# Patient Record
Sex: FEMALE | Race: WHITE | Employment: UNEMPLOYED | URBAN - METROPOLITAN AREA
[De-identification: names, ages, dates, MRNs, and addresses within clinical notes are randomized per-mention and may not be internally consistent; named-entity substitution may affect disease eponyms.]

---

## 2021-02-21 ENCOUNTER — HOSPITAL ENCOUNTER (EMERGENCY)
Age: 26
Discharge: HOME | End: 2021-02-21
Attending: EMERGENCY MEDICINE
Payer: COMMERCIAL

## 2021-02-21 ENCOUNTER — APPOINTMENT (OUTPATIENT)
Dept: GENERAL RADIOLOGY | Age: 26
End: 2021-02-21
Payer: COMMERCIAL

## 2021-02-21 VITALS
HEART RATE: 106 BPM | BODY MASS INDEX: 34.53 KG/M2 | SYSTOLIC BLOOD PRESSURE: 121 MMHG | TEMPERATURE: 98.1 F | OXYGEN SATURATION: 99 % | RESPIRATION RATE: 18 BRPM | WEIGHT: 220 LBS | DIASTOLIC BLOOD PRESSURE: 75 MMHG | HEIGHT: 67 IN

## 2021-02-21 DIAGNOSIS — F12.90 MARIJUANA USE: Primary | ICD-10-CM

## 2021-02-21 DIAGNOSIS — R00.2 PALPITATIONS: ICD-10-CM

## 2021-02-21 LAB
ABSOLUTE EOS #: 0.1 K/UL (ref 0–0.4)
ABSOLUTE IMMATURE GRANULOCYTE: ABNORMAL K/UL (ref 0–0.3)
ABSOLUTE LYMPH #: 3.6 K/UL (ref 1–4.8)
ABSOLUTE MONO #: 0.9 K/UL (ref 0.1–1.3)
ANION GAP SERPL CALCULATED.3IONS-SCNC: 13 MMOL/L (ref 9–17)
BASOPHILS # BLD: 1 % (ref 0–2)
BASOPHILS ABSOLUTE: 0.1 K/UL (ref 0–0.2)
BUN BLDV-MCNC: 17 MG/DL (ref 6–20)
BUN/CREAT BLD: ABNORMAL (ref 9–20)
CALCIUM SERPL-MCNC: 9.4 MG/DL (ref 8.6–10.4)
CHLORIDE BLD-SCNC: 102 MMOL/L (ref 98–107)
CO2: 23 MMOL/L (ref 20–31)
CREAT SERPL-MCNC: 0.96 MG/DL (ref 0.5–0.9)
D-DIMER QUANTITATIVE: 0.31 MG/L FEU (ref 0–0.59)
DIFFERENTIAL TYPE: ABNORMAL
EOSINOPHILS RELATIVE PERCENT: 1 % (ref 0–4)
GFR AFRICAN AMERICAN: >60 ML/MIN
GFR NON-AFRICAN AMERICAN: >60 ML/MIN
GFR SERPL CREATININE-BSD FRML MDRD: ABNORMAL ML/MIN/{1.73_M2}
GFR SERPL CREATININE-BSD FRML MDRD: ABNORMAL ML/MIN/{1.73_M2}
GLUCOSE BLD-MCNC: 179 MG/DL (ref 70–99)
HCG QUALITATIVE: NEGATIVE
HCT VFR BLD CALC: 33 % (ref 36–46)
HEMOGLOBIN: 11.4 G/DL (ref 12–16)
IMMATURE GRANULOCYTES: ABNORMAL %
LYMPHOCYTES # BLD: 33 % (ref 24–44)
MAGNESIUM: 1.8 MG/DL (ref 1.6–2.6)
MCH RBC QN AUTO: 30 PG (ref 26–34)
MCHC RBC AUTO-ENTMCNC: 34.4 G/DL (ref 31–37)
MCV RBC AUTO: 87.3 FL (ref 80–100)
MONOCYTES # BLD: 8 % (ref 1–7)
NRBC AUTOMATED: ABNORMAL PER 100 WBC
PDW BLD-RTO: 12.9 % (ref 11.5–14.9)
PLATELET # BLD: 264 K/UL (ref 150–450)
PLATELET ESTIMATE: ABNORMAL
PMV BLD AUTO: 9.6 FL (ref 6–12)
POTASSIUM SERPL-SCNC: 3.1 MMOL/L (ref 3.7–5.3)
RBC # BLD: 3.79 M/UL (ref 4–5.2)
RBC # BLD: ABNORMAL 10*6/UL
SEG NEUTROPHILS: 57 % (ref 36–66)
SEGMENTED NEUTROPHILS ABSOLUTE COUNT: 6.1 K/UL (ref 1.3–9.1)
SODIUM BLD-SCNC: 138 MMOL/L (ref 135–144)
TROPONIN INTERP: NORMAL
TROPONIN T: NORMAL NG/ML
TROPONIN, HIGH SENSITIVITY: <6 NG/L (ref 0–14)
TSH SERPL DL<=0.05 MIU/L-ACNC: 2.02 MIU/L (ref 0.3–5)
WBC # BLD: 10.7 K/UL (ref 3.5–11)
WBC # BLD: ABNORMAL 10*3/UL

## 2021-02-21 PROCEDURE — 2580000003 HC RX 258: Performed by: EMERGENCY MEDICINE

## 2021-02-21 PROCEDURE — 6360000002 HC RX W HCPCS: Performed by: EMERGENCY MEDICINE

## 2021-02-21 PROCEDURE — 83735 ASSAY OF MAGNESIUM: CPT

## 2021-02-21 PROCEDURE — 84484 ASSAY OF TROPONIN QUANT: CPT

## 2021-02-21 PROCEDURE — 85379 FIBRIN DEGRADATION QUANT: CPT

## 2021-02-21 PROCEDURE — 99283 EMERGENCY DEPT VISIT LOW MDM: CPT

## 2021-02-21 PROCEDURE — 80048 BASIC METABOLIC PNL TOTAL CA: CPT

## 2021-02-21 PROCEDURE — 96375 TX/PRO/DX INJ NEW DRUG ADDON: CPT

## 2021-02-21 PROCEDURE — 85025 COMPLETE CBC W/AUTO DIFF WBC: CPT

## 2021-02-21 PROCEDURE — 96374 THER/PROPH/DIAG INJ IV PUSH: CPT

## 2021-02-21 PROCEDURE — 36415 COLL VENOUS BLD VENIPUNCTURE: CPT

## 2021-02-21 PROCEDURE — 84703 CHORIONIC GONADOTROPIN ASSAY: CPT

## 2021-02-21 PROCEDURE — 71045 X-RAY EXAM CHEST 1 VIEW: CPT

## 2021-02-21 PROCEDURE — 93005 ELECTROCARDIOGRAM TRACING: CPT | Performed by: EMERGENCY MEDICINE

## 2021-02-21 PROCEDURE — 84443 ASSAY THYROID STIM HORMONE: CPT

## 2021-02-21 RX ORDER — DEXTROAMPHETAMINE SACCHARATE, AMPHETAMINE ASPARTATE, DEXTROAMPHETAMINE SULFATE AND AMPHETAMINE SULFATE 5; 5; 5; 5 MG/1; MG/1; MG/1; MG/1
20 TABLET ORAL DAILY
COMMUNITY

## 2021-02-21 RX ORDER — LORAZEPAM 2 MG/ML
1 INJECTION INTRAMUSCULAR ONCE
Status: COMPLETED | OUTPATIENT
Start: 2021-02-21 | End: 2021-02-21

## 2021-02-21 RX ORDER — ONDANSETRON 2 MG/ML
4 INJECTION INTRAMUSCULAR; INTRAVENOUS ONCE
Status: COMPLETED | OUTPATIENT
Start: 2021-02-21 | End: 2021-02-21

## 2021-02-21 RX ORDER — POTASSIUM CHLORIDE 20 MEQ/1
40 TABLET, EXTENDED RELEASE ORAL ONCE
Status: DISCONTINUED | OUTPATIENT
Start: 2021-02-21 | End: 2021-02-21 | Stop reason: HOSPADM

## 2021-02-21 RX ORDER — 0.9 % SODIUM CHLORIDE 0.9 %
2000 INTRAVENOUS SOLUTION INTRAVENOUS ONCE
Status: COMPLETED | OUTPATIENT
Start: 2021-02-21 | End: 2021-02-21

## 2021-02-21 RX ADMIN — SODIUM CHLORIDE 2000 ML: 9 INJECTION, SOLUTION INTRAVENOUS at 03:12

## 2021-02-21 RX ADMIN — LORAZEPAM 1 MG: 2 INJECTION INTRAMUSCULAR; INTRAVENOUS at 03:12

## 2021-02-21 RX ADMIN — ONDANSETRON 4 MG: 2 INJECTION INTRAMUSCULAR; INTRAVENOUS at 03:43

## 2021-02-21 ASSESSMENT — ENCOUNTER SYMPTOMS
BACK PAIN: 0
ABDOMINAL PAIN: 0
VOMITING: 0
COUGH: 0
SHORTNESS OF BREATH: 1
DIARRHEA: 0
NAUSEA: 1

## 2021-02-21 ASSESSMENT — PAIN DESCRIPTION - LOCATION: LOCATION: CHEST

## 2021-02-21 ASSESSMENT — PAIN SCALES - GENERAL: PAINLEVEL_OUTOF10: 7

## 2021-02-21 ASSESSMENT — PAIN DESCRIPTION - DESCRIPTORS: DESCRIPTORS: TIGHTNESS

## 2021-02-21 ASSESSMENT — PAIN DESCRIPTION - FREQUENCY: FREQUENCY: CONTINUOUS

## 2021-02-21 ASSESSMENT — PAIN DESCRIPTION - PAIN TYPE: TYPE: ACUTE PAIN

## 2021-02-21 NOTE — ED PROVIDER NOTES
EMERGENCY DEPARTMENT ENCOUNTER    Pt Name: Papito Zaidi  MRN: [id number]  [place] 1995  Date of evaluation: 2/21/21  CHIEF COMPLAINT       Chief Complaint   Patient presents with    Dizziness    Palpitations     HISTORY OF PRESENT ILLNESS   HPI     This is a 81-year-old female who comes in today. The patient states that she was celebrating her birthday she had a beer and a half and a 2 edibles and around midnight she started feeling very anxious palpitations chest tightness and near syncope denies history of similar symptoms patient states that she normally does not use marijuana. She denies history of similar symptoms denies history of anxiety. She denies history of thyroid disease she denies family history of early heart attacks or early death. She denies any recent illness fevers chills cough no abdominal pain or vomiting but does feel mildly nauseous    REVIEW OF SYSTEMS     Review of Systems   Constitutional: Negative for fever. HENT: Negative for congestion. Respiratory: Positive for shortness of breath. Negative for cough. Cardiovascular: Positive for chest pain and palpitations. Gastrointestinal: Positive for nausea. Negative for abdominal pain, diarrhea and vomiting. Genitourinary: Negative for dysuria. Musculoskeletal: Negative for back pain. Skin: Negative for rash. Neurological: Negative for headaches. Psychiatric/Behavioral: The patient is nervous/anxious. All other systems reviewed and are negative. PASTMEDICAL HISTORY   History reviewed. No pertinent past medical history. SURGICAL HISTORY     History reviewed. No pertinent surgical history. CURRENT MEDICATIONS       Previous Medications    AMPHETAMINE-DEXTROAMPHETAMINE (ADDERALL) 20 MG TABLET    Take 20 mg by mouth daily. METHYLPHENIDATE (CONCERTA) 27 MG CR TABLET         ALLERGIES     has No Known Allergies. FAMILY HISTORY     has no family status information on file.       SOCIAL HISTORY Social History     Tobacco Use    Smoking status: Never Smoker    Smokeless tobacco: Never Used   Substance Use Topics    Alcohol use: No    Drug use: No     PHYSICAL EXAM     INITIAL VITALS: /75   Pulse 106   Temp 98.1 °F (36.7 °C) (Oral)   Resp 18   Ht 5' 7\" (1.702 m)   Wt 220 lb (99.8 kg)   SpO2 99%   BMI 34.46 kg/m²    Physical Exam  Vitals signs and nursing note reviewed. Constitutional:       Appearance: She is well-developed. HENT:      Head: Normocephalic and atraumatic. Eyes:      Conjunctiva/sclera: Conjunctivae normal.   Neck:      Musculoskeletal: Neck supple. Cardiovascular:      Rate and Rhythm: Regular rhythm. Tachycardia present. Heart sounds: No murmur. No friction rub. Pulmonary:      Effort: Pulmonary effort is normal. No respiratory distress. Breath sounds: Normal breath sounds. No wheezing or rhonchi. Abdominal:      General: There is no distension. Palpations: Abdomen is soft. Tenderness: There is no abdominal tenderness. There is no guarding or rebound. Musculoskeletal:      Comments: No lower extremity edema   Skin:     General: Skin is warm and dry. Capillary Refill: Capillary refill takes less than 2 seconds. Neurological:      Mental Status: She is alert. Psychiatric:      Comments: Appears anxious       DIAGNOSTIC RESULTS   RADIOLOGY:All plain film, CT, MRI, and formal ultrasound images (except ED bedside ultrasound) are read by the radiologist, see reports below, unless otherwisenoted in MDM or here. XR CHEST PORTABLE   Final Result   1. Low lung volumes. LABS: All lab results were reviewed by myself, and all abnormals are listed below.   Labs Reviewed   CBC WITH AUTO DIFFERENTIAL - Abnormal; Notable for the following components:       Result Value    RBC 3.79 (*)     Hemoglobin 11.4 (*)     Hematocrit 33.0 (*)     Monocytes 8 (*)     All other components within normal limits   BASIC METABOLIC PANEL - Abnormal; Notable for the following components:    Glucose 179 (*)     CREATININE 0.96 (*)     Potassium 3.1 (*)     All other components within normal limits   MAGNESIUM   TROPONIN   HCG, SERUM, QUALITATIVE   TSH WITH REFLEX   D-DIMER, QUANTITATIVE   TROPONIN       EMERGENCY DEPARTMENTCOURSE:   Differential diagnosis includes drug reaction ACS thyroid dysfunction electrolyte abnormality. Patient symptoms started after using 2 edibles and she normally does not do drugs most likely the etiology for the patient's symptoms. We will give her dose of Ativan    4:13 AM EST  Patient has mildly low potassium at 3.1 no other electrolyte abnormalities no anion gap elevation no elevation in creatinine no leukocytosis mild anemia with a hemoglobin of 11.4 TSH is normal pregnancy is negative troponin is negative D-dimer is not elevated do not believe that she has a pulmonary embolism at this time.  Patient feels better after dose of Ativan she is resting comfortably she feels much better she was given IV fluids do not believe that she requires further work-up in the emergency department the patient will be discharged she was biased against no longer using marijuana       EKG:All EKG's are interpreted by the Emergency Department Physician who either signs or Co-signs this chart in the absence of a cardiologist.  Sinus tachycardia with a heart rate of 139 bpm normal axis no acute ST or T wave changes      Vitals:    Vitals:    02/21/21 0330 02/21/21 0336 02/21/21 0400 02/21/21 0413   BP: 123/74  121/75    Pulse: 124 116 113 106   Resp: 21 21 18    Temp:       TempSrc:       SpO2: 99% 99% 99%    Weight:       Height:           The patient was given the following medications while in the emergency department:  Orders Placed This Encounter   Medications    0.9 % sodium chloride bolus    LORazepam (ATIVAN) injection 1 mg    ondansetron (ZOFRAN) injection 4 mg    potassium chloride (KLOR-CON M) extended release tablet 40 mEq         FINAL IMPRESSION      1. Marijuana use    2.  Palpitations         DISPOSITION/PLAN   DISPOSITION Decision To Discharge 02/21/2021 04:14:13 AM      PATIENT REFERRED TO:  [facility]  [place] [id number]  [address] [place] [address]  [phone]    If symptoms worsen  Jewell Vergara MD  Attending Emergency Physician    This charting supersedes any ED resident or staff charting and was written using speech recognition software       Jewell Vergara MD  02/21/21 5645

## 2021-02-22 LAB
EKG ATRIAL RATE: 139 BPM
EKG P AXIS: 59 DEGREES
EKG P-R INTERVAL: 128 MS
EKG Q-T INTERVAL: 288 MS
EKG QRS DURATION: 82 MS
EKG QTC CALCULATION (BAZETT): 438 MS
EKG R AXIS: 59 DEGREES
EKG T AXIS: 12 DEGREES
EKG VENTRICULAR RATE: 139 BPM

## 2021-02-22 PROCEDURE — 93010 ELECTROCARDIOGRAM REPORT: CPT | Performed by: INTERNAL MEDICINE

## 2022-02-02 ENCOUNTER — APPOINTMENT (OUTPATIENT)
Dept: GENERAL RADIOLOGY | Age: 27
End: 2022-02-02
Payer: COMMERCIAL

## 2022-02-02 ENCOUNTER — APPOINTMENT (OUTPATIENT)
Dept: CT IMAGING | Age: 27
End: 2022-02-02
Payer: COMMERCIAL

## 2022-02-02 ENCOUNTER — HOSPITAL ENCOUNTER (EMERGENCY)
Age: 27
Discharge: ANOTHER HOSPITAL | End: 2022-02-03
Attending: EMERGENCY MEDICINE
Payer: COMMERCIAL

## 2022-02-02 DIAGNOSIS — A41.9 SEPSIS, DUE TO UNSPECIFIED ORGANISM, UNSPECIFIED WHETHER ACUTE ORGAN DYSFUNCTION PRESENT (HCC): ICD-10-CM

## 2022-02-02 DIAGNOSIS — R50.9 FEVER AND CHILLS: Primary | ICD-10-CM

## 2022-02-02 LAB
-: NORMAL
ALBUMIN SERPL-MCNC: 4.1 G/DL (ref 3.5–5.2)
ALBUMIN/GLOBULIN RATIO: ABNORMAL (ref 1–2.5)
ALP BLD-CCNC: 153 U/L (ref 35–104)
ALT SERPL-CCNC: 29 U/L (ref 5–33)
ANION GAP SERPL CALCULATED.3IONS-SCNC: 18 MMOL/L (ref 9–17)
AST SERPL-CCNC: 19 U/L
BILIRUB SERPL-MCNC: 0.46 MG/DL (ref 0.3–1.2)
BUN BLDV-MCNC: 10 MG/DL (ref 6–20)
BUN/CREAT BLD: ABNORMAL (ref 9–20)
CALCIUM SERPL-MCNC: 9.1 MG/DL (ref 8.6–10.4)
CHLORIDE BLD-SCNC: 100 MMOL/L (ref 98–107)
CO2: 18 MMOL/L (ref 20–31)
CREAT SERPL-MCNC: 0.9 MG/DL (ref 0.5–0.9)
GFR AFRICAN AMERICAN: >60 ML/MIN
GFR NON-AFRICAN AMERICAN: >60 ML/MIN
GFR SERPL CREATININE-BSD FRML MDRD: ABNORMAL ML/MIN/{1.73_M2}
GFR SERPL CREATININE-BSD FRML MDRD: ABNORMAL ML/MIN/{1.73_M2}
GLUCOSE BLD-MCNC: 104 MG/DL (ref 70–99)
INR BLD: 1
LACTIC ACID, SEPSIS WHOLE BLOOD: NORMAL MMOL/L (ref 0.5–1.9)
LACTIC ACID, SEPSIS: 1 MMOL/L (ref 0.5–1.9)
PARTIAL THROMBOPLASTIN TIME: 19.8 SEC (ref 24–36)
POTASSIUM SERPL-SCNC: 3.6 MMOL/L (ref 3.7–5.3)
PROTHROMBIN TIME: 13.2 SEC (ref 11.8–14.6)
REASON FOR REJECTION: NORMAL
SODIUM BLD-SCNC: 136 MMOL/L (ref 135–144)
TOTAL PROTEIN: 7.5 G/DL (ref 6.4–8.3)
ZZ NTE CLEAN UP: ORDERED TEST: NORMAL
ZZ NTE WITH NAME CLEAN UP: SPECIMEN SOURCE: NORMAL

## 2022-02-02 PROCEDURE — 6360000002 HC RX W HCPCS: Performed by: HEALTH CARE PROVIDER

## 2022-02-02 PROCEDURE — 74177 CT ABD & PELVIS W/CONTRAST: CPT

## 2022-02-02 PROCEDURE — 85730 THROMBOPLASTIN TIME PARTIAL: CPT

## 2022-02-02 PROCEDURE — 86901 BLOOD TYPING SEROLOGIC RH(D): CPT

## 2022-02-02 PROCEDURE — 85025 COMPLETE CBC W/AUTO DIFF WBC: CPT

## 2022-02-02 PROCEDURE — 86850 RBC ANTIBODY SCREEN: CPT

## 2022-02-02 PROCEDURE — 96375 TX/PRO/DX INJ NEW DRUG ADDON: CPT

## 2022-02-02 PROCEDURE — 86900 BLOOD TYPING SEROLOGIC ABO: CPT

## 2022-02-02 PROCEDURE — 84702 CHORIONIC GONADOTROPIN TEST: CPT

## 2022-02-02 PROCEDURE — 80053 COMPREHEN METABOLIC PANEL: CPT

## 2022-02-02 PROCEDURE — 93005 ELECTROCARDIOGRAM TRACING: CPT | Performed by: HEALTH CARE PROVIDER

## 2022-02-02 PROCEDURE — 6360000004 HC RX CONTRAST MEDICATION: Performed by: EMERGENCY MEDICINE

## 2022-02-02 PROCEDURE — 87040 BLOOD CULTURE FOR BACTERIA: CPT

## 2022-02-02 PROCEDURE — 71045 X-RAY EXAM CHEST 1 VIEW: CPT

## 2022-02-02 PROCEDURE — 87205 SMEAR GRAM STAIN: CPT

## 2022-02-02 PROCEDURE — 36415 COLL VENOUS BLD VENIPUNCTURE: CPT

## 2022-02-02 PROCEDURE — 83605 ASSAY OF LACTIC ACID: CPT

## 2022-02-02 PROCEDURE — 99285 EMERGENCY DEPT VISIT HI MDM: CPT

## 2022-02-02 PROCEDURE — 96365 THER/PROPH/DIAG IV INF INIT: CPT

## 2022-02-02 PROCEDURE — 85610 PROTHROMBIN TIME: CPT

## 2022-02-02 RX ORDER — 0.9 % SODIUM CHLORIDE 0.9 %
80 INTRAVENOUS SOLUTION INTRAVENOUS ONCE
Status: COMPLETED | OUTPATIENT
Start: 2022-02-02 | End: 2022-02-03

## 2022-02-02 RX ORDER — ONDANSETRON 2 MG/ML
4 INJECTION INTRAMUSCULAR; INTRAVENOUS ONCE
Status: COMPLETED | OUTPATIENT
Start: 2022-02-02 | End: 2022-02-02

## 2022-02-02 RX ORDER — SODIUM CHLORIDE 0.9 % (FLUSH) 0.9 %
10 SYRINGE (ML) INJECTION PRN
Status: DISCONTINUED | OUTPATIENT
Start: 2022-02-02 | End: 2022-02-03 | Stop reason: HOSPADM

## 2022-02-02 RX ADMIN — ONDANSETRON 4 MG: 2 INJECTION INTRAMUSCULAR; INTRAVENOUS at 23:46

## 2022-02-02 ASSESSMENT — PAIN SCALES - GENERAL: PAINLEVEL_OUTOF10: 9

## 2022-02-02 ASSESSMENT — ENCOUNTER SYMPTOMS
COUGH: 0
ABDOMINAL PAIN: 0
RHINORRHEA: 0
SORE THROAT: 0
SHORTNESS OF BREATH: 0
EYE REDNESS: 0
NAUSEA: 0
BACK PAIN: 1
VOMITING: 0

## 2022-02-02 ASSESSMENT — PAIN DESCRIPTION - LOCATION: LOCATION: HEAD;BACK

## 2022-02-03 VITALS
RESPIRATION RATE: 18 BRPM | TEMPERATURE: 98.8 F | HEIGHT: 67 IN | DIASTOLIC BLOOD PRESSURE: 80 MMHG | BODY MASS INDEX: 29.66 KG/M2 | WEIGHT: 189 LBS | SYSTOLIC BLOOD PRESSURE: 120 MMHG | HEART RATE: 90 BPM | OXYGEN SATURATION: 98 %

## 2022-02-03 LAB
ABO/RH: NORMAL
ABSOLUTE EOS #: 0 K/UL (ref 0–0.4)
ABSOLUTE IMMATURE GRANULOCYTE: ABNORMAL K/UL (ref 0–0.3)
ABSOLUTE LYMPH #: 2.11 K/UL (ref 1–4.8)
ABSOLUTE MONO #: 0.21 K/UL (ref 0.1–1.3)
ANTIBODY SCREEN: NEGATIVE
ARM BAND NUMBER: NORMAL
BASOPHILS # BLD: 1 % (ref 0–2)
BASOPHILS ABSOLUTE: 0.21 K/UL (ref 0–0.2)
BLOOD BANK COMMENT: NORMAL
CANDIDA SPECIES, DNA PROBE: NEGATIVE
DIFFERENTIAL TYPE: ABNORMAL
EOSINOPHILS RELATIVE PERCENT: 0 % (ref 0–4)
EXPIRATION DATE: NORMAL
GARDNERELLA VAGINALIS, DNA PROBE: POSITIVE
HCG QUANTITATIVE: 15 IU/L
HCT VFR BLD CALC: 35.3 % (ref 36–46)
HEMOGLOBIN: 11.1 G/DL (ref 12–16)
IMMATURE GRANULOCYTES: ABNORMAL %
LYMPHOCYTES # BLD: 10 % (ref 24–44)
MCH RBC QN AUTO: 26.5 PG (ref 26–34)
MCHC RBC AUTO-ENTMCNC: 31.3 G/DL (ref 31–37)
MCV RBC AUTO: 84.5 FL (ref 80–100)
MONOCYTES # BLD: 1 % (ref 1–7)
MORPHOLOGY: ABNORMAL
NRBC AUTOMATED: ABNORMAL PER 100 WBC
PDW BLD-RTO: 16.3 % (ref 11.5–14.9)
PLATELET # BLD: 296 K/UL (ref 150–450)
PLATELET ESTIMATE: ABNORMAL
PMV BLD AUTO: 8.3 FL (ref 6–12)
RBC # BLD: 4.18 M/UL (ref 4–5.2)
RBC # BLD: ABNORMAL 10*6/UL
SARS-COV-2, RAPID: NOT DETECTED
SEG NEUTROPHILS: 88 % (ref 36–66)
SEGMENTED NEUTROPHILS ABSOLUTE COUNT: 18.57 K/UL (ref 1.3–9.1)
SOURCE: ABNORMAL
SPECIMEN DESCRIPTION: NORMAL
TRICHOMONAS VAGINALIS DNA: NEGATIVE
WBC # BLD: 21.1 K/UL (ref 3.5–11)
WBC # BLD: ABNORMAL 10*3/UL

## 2022-02-03 PROCEDURE — 87510 GARDNER VAG DNA DIR PROBE: CPT

## 2022-02-03 PROCEDURE — 6360000004 HC RX CONTRAST MEDICATION: Performed by: EMERGENCY MEDICINE

## 2022-02-03 PROCEDURE — 87591 N.GONORRHOEAE DNA AMP PROB: CPT

## 2022-02-03 PROCEDURE — 2580000003 HC RX 258: Performed by: EMERGENCY MEDICINE

## 2022-02-03 PROCEDURE — 87635 SARS-COV-2 COVID-19 AMP PRB: CPT

## 2022-02-03 PROCEDURE — 87491 CHLMYD TRACH DNA AMP PROBE: CPT

## 2022-02-03 PROCEDURE — 6360000002 HC RX W HCPCS: Performed by: HEALTH CARE PROVIDER

## 2022-02-03 PROCEDURE — 87480 CANDIDA DNA DIR PROBE: CPT

## 2022-02-03 PROCEDURE — 2580000003 HC RX 258: Performed by: HEALTH CARE PROVIDER

## 2022-02-03 PROCEDURE — 6370000000 HC RX 637 (ALT 250 FOR IP): Performed by: EMERGENCY MEDICINE

## 2022-02-03 PROCEDURE — 87660 TRICHOMONAS VAGIN DIR PROBE: CPT

## 2022-02-03 PROCEDURE — 2500000003 HC RX 250 WO HCPCS: Performed by: HEALTH CARE PROVIDER

## 2022-02-03 RX ORDER — SODIUM CHLORIDE, SODIUM LACTATE, POTASSIUM CHLORIDE, AND CALCIUM CHLORIDE .6; .31; .03; .02 G/100ML; G/100ML; G/100ML; G/100ML
1000 INJECTION, SOLUTION INTRAVENOUS ONCE
Status: COMPLETED | OUTPATIENT
Start: 2022-02-03 | End: 2022-02-03

## 2022-02-03 RX ORDER — CLINDAMYCIN PHOSPHATE 900 MG/50ML
900 INJECTION INTRAVENOUS ONCE
Status: COMPLETED | OUTPATIENT
Start: 2022-02-03 | End: 2022-02-03

## 2022-02-03 RX ORDER — KETOROLAC TROMETHAMINE 30 MG/ML
30 INJECTION, SOLUTION INTRAMUSCULAR; INTRAVENOUS ONCE
Status: COMPLETED | OUTPATIENT
Start: 2022-02-03 | End: 2022-02-03

## 2022-02-03 RX ORDER — ACETAMINOPHEN 500 MG
1000 TABLET ORAL ONCE
Status: COMPLETED | OUTPATIENT
Start: 2022-02-03 | End: 2022-02-03

## 2022-02-03 RX ADMIN — CLINDAMYCIN PHOSPHATE 900 MG: 900 INJECTION, SOLUTION INTRAVENOUS at 01:44

## 2022-02-03 RX ADMIN — IOPAMIDOL 75 ML: 755 INJECTION, SOLUTION INTRAVENOUS at 00:10

## 2022-02-03 RX ADMIN — GENTAMICIN SULFATE 431.2 MG: 40 INJECTION, SOLUTION INTRAMUSCULAR; INTRAVENOUS at 02:41

## 2022-02-03 RX ADMIN — SODIUM CHLORIDE, POTASSIUM CHLORIDE, SODIUM LACTATE AND CALCIUM CHLORIDE 1000 ML: 600; 310; 30; 20 INJECTION, SOLUTION INTRAVENOUS at 00:35

## 2022-02-03 RX ADMIN — AMPICILLIN SODIUM 2000 MG: 2 INJECTION, POWDER, FOR SOLUTION INTRAMUSCULAR; INTRAVENOUS at 01:14

## 2022-02-03 RX ADMIN — SODIUM CHLORIDE, PRESERVATIVE FREE 10 ML: 5 INJECTION INTRAVENOUS at 00:10

## 2022-02-03 RX ADMIN — KETOROLAC TROMETHAMINE 30 MG: 30 INJECTION, SOLUTION INTRAMUSCULAR; INTRAVENOUS at 01:12

## 2022-02-03 RX ADMIN — SODIUM CHLORIDE 80 ML: 9 INJECTION, SOLUTION INTRAVENOUS at 00:11

## 2022-02-03 RX ADMIN — ACETAMINOPHEN 1000 MG: 500 TABLET ORAL at 01:25

## 2022-02-03 ASSESSMENT — PAIN SCALES - GENERAL
PAINLEVEL_OUTOF10: 6
PAINLEVEL_OUTOF10: 6

## 2022-02-03 NOTE — ED PROVIDER NOTES
[facility]  Emergency Department Encounter  Emergency Medicine Resident     Pt Name: Santosh Mcclure  MRN: [id number]  [place] 1995  Date of evaluation: 22  PCP:  No primary care provider on file. CHIEF COMPLAINT       Chief Complaint   Patient presents with    Back Pain    Fever    Headache       HISTORY OFPRESENT ILLNESS  (Location/Symptom, Timing/Onset, Context/Setting, Quality, Duration, Modifying Factors,Severity.)      Santosh Mcclure is a 27 y.o. female  1 week postpartum from an uncomplicated vaginal delivery who presents with fever and low back pain. Pain started approximately half hour prior to arrival.  Patient states that she was woken from sleep due to sudden, severe pain in her lower back. Pain is constant, spasming and 9/10 in severity. No radiation. Patient also noted a home sublingual temperature measurement of 104 °F.  Patient also complaining of rigors and sensation of being unable to get warm. Additionally, complains of mild nausea. PAST MEDICAL / SURGICAL / SOCIAL / FAMILY HISTORY      has no past medical history on file. has no past surgical history on file.      Social History     Socioeconomic History    Marital status: Single     Spouse name: Not on file    Number of children: Not on file    Years of education: Not on file    Highest education level: Not on file   Occupational History    Not on file   Tobacco Use    Smoking status: Never Smoker    Smokeless tobacco: Never Used   Substance and Sexual Activity    Alcohol use: No    Drug use: No    Sexual activity: Yes     Partners: Male   Other Topics Concern    Not on file   Social History Narrative    Not on file     Social Determinants of Health     Financial Resource Strain:     Difficulty of Paying Living Expenses: Not on file   Food Insecurity:     Worried About Running Out of Food in the Last Year: Not on file    Mer of Food in the Last Year: Not on file Transportation Needs:     Lack of Transportation (Medical): Not on file    Lack of Transportation (Non-Medical): Not on file   Physical Activity:     Days of Exercise per Week: Not on file    Minutes of Exercise per Session: Not on file   Stress:     Feeling of Stress : Not on file   Social Connections:     Frequency of Communication with Friends and Family: Not on file    Frequency of Social Gatherings with Friends and Family: Not on file    Attends Judaism Services: Not on file    Active Member of [address] [place] [address] [employer] or Organizations: Not on file    Attends Club or Organization Meetings: Not on file    Marital Status: Not on file   Intimate Partner Violence:     Fear of Current or Ex-Partner: Not on file    Emotionally Abused: Not on file    Physically Abused: Not on file    Sexually Abused: Not on file   Housing Stability:     Unable to Pay for Housing in the Last Year: Not on file    Number of [place] in the Last Year: Not on file    Unstable Housing in the Last Year: Not on file       History reviewed. No pertinent family history. Allergies:  Patient has no known allergies. Home Medications:  Prior to Admission medications    Medication Sig Start Date End Date Taking? Authorizing Provider   amphetamine-dextroamphetamine (ADDERALL) 20 MG tablet Take 20 mg by mouth daily. Historical Provider, MD   methylphenidate (CONCERTA) 27 MG CR tablet  12/22/14   Historical Provider, MD       REVIEW OFSYSTEMS    (2-9 systems for level 4, 10 or more for level 5)      Review of Systems   Constitutional: Positive for chills, diaphoresis and fever. Negative for fatigue. HENT: Negative for congestion, rhinorrhea and sore throat. Eyes: Negative for redness. Respiratory: Negative for cough and shortness of breath. Cardiovascular: Negative for chest pain. Gastrointestinal: Negative for abdominal pain, nausea and vomiting. Genitourinary: Positive for vaginal discharge.  Negative for dysuria and vaginal bleeding. Musculoskeletal: Positive for back pain. Negative for myalgias, neck pain and neck stiffness. Allergic/Immunologic: Negative for immunocompromised state. Neurological: Negative for dizziness, light-headedness and headaches. Hematological: Negative for adenopathy. PHYSICAL EXAM   (up to 7 for level 4, 8 or more forlevel 5)      INITIAL VITALS:   ED Triage Vitals [02/02/22 2257]   BP Temp Temp Source Pulse Resp SpO2 Height Weight   120/80 100.8 °F (38.2 °C) Oral 132 20 98 % -- --       Physical Exam  Vitals reviewed. Exam conducted with a chaperone present. Constitutional:       General: She is in acute distress. Appearance: She is well-developed. HENT:      Head: Normocephalic and atraumatic. Mouth/Throat:      Mouth: Mucous membranes are moist.      Pharynx: Oropharynx is clear. Eyes:      Extraocular Movements: Extraocular movements intact. Cardiovascular:      Rate and Rhythm: Normal rate and regular rhythm. Heart sounds: Normal heart sounds. Pulmonary:      Effort: Pulmonary effort is normal.      Breath sounds: Normal breath sounds. Abdominal:      General: Abdomen is flat. Palpations: Abdomen is soft. Tenderness: There is abdominal tenderness. Hernia: No hernia is present. Comments: Mild suprapubic TTP   Genitourinary:     Vagina: Vaginal discharge present. Comments: Cervical os dilated to approximately 1 cm. Significant purulent drainage coming from the os. No CMT. Bilateral adnexal tenderness. Skin:     General: Skin is warm and dry. Capillary Refill: Capillary refill takes less than 2 seconds. Neurological:      General: No focal deficit present. Mental Status: She is alert and oriented to person, place, and time.    Psychiatric:         Mood and Affect: Mood normal.         Behavior: Behavior normal.         DIFFERENTIAL  DIAGNOSIS     PLAN (LABS / IMAGING / EKG):  Orders Placed This Encounter   Procedures    Culture, Urine    Culture, Blood 1    Culture, Blood 2    Vaginitis DNA Probe    C.trachomatis N.gonorrhoeae DNA    XR CHEST PORTABLE    CT ABDOMEN PELVIS W IV CONTRAST Additional Contrast? None    Comprehensive Metabolic Panel w/ Reflex to MG    Lactate, Sepsis    APTT    Protime-INR    HCG, Quantitative, Pregnancy    Urinalysis with Microscopic    SPECIMEN REJECTION    CBC Auto Differential    Inpatient consult to Obstetrics / Gynecology    EKG 12 Lead    TYPE AND SCREEN       MEDICATIONS ORDERED:  Orders Placed This Encounter   Medications    ondansetron (ZOFRAN) injection 4 mg    0.9 % sodium chloride bolus    iopamidol (ISOVUE-370) 76 % injection 75 mL    sodium chloride flush 0.9 % injection 10 mL    clindamycin (CLEOCIN) 900 mg in dextrose 5 % 50 mL IVPB     Order Specific Question:   Antimicrobial Indications     Answer: Other     Order Specific Question:   Other Abx Indication     Answer:   Postpartum endometritis    gentamicin (GARAMYCIN) 431.2 mg in dextrose 5 % 250 mL IVPB     Order Specific Question:   Antimicrobial Indications     Answer: Other     Order Specific Question:   Other Abx Indication     Answer:   postpartum endometritis    ampicillin 2000 mg ivpb mini bag     Order Specific Question:   Antimicrobial Indications     Answer: Other     Order Specific Question:   Other Abx Indication     Answer:   postpartum endometritis    ketorolac (TORADOL) injection 30 mg    lactated ringers bolus       DDX: Retained products of conception, sepsis, septic shock, intra-abdominal infection, UTI, STI    Initial MDM/Plan: 27 y.o. female who presents with fever, low back pain and vaginal discharge 1 week status post partum from an uncomplicated vaginal delivery. Will obtain full septic work-up given patient's fever, tachycardia and tachypnea. Will also obtain CT of the abdomen pelvis to assess for intra-abdominal pathology.     DIAGNOSTIC RESULTS / EMERGENCYDEPARTMENT COURSE / [facility]     LABS:  Labs Reviewed   COMPREHENSIVE METABOLIC PANEL W/ REFLEX TO MG FOR LOW K - Abnormal; Notable for the following components:       Result Value    Glucose 104 (*)     Potassium 3.6 (*)     CO2 18 (*)     Anion Gap 18 (*)     Alkaline Phosphatase 153 (*)     All other components within normal limits   APTT - Abnormal; Notable for the following components:    PTT 19.8 (*)     All other components within normal limits   HCG, QUANTITATIVE, PREGNANCY - Abnormal; Notable for the following components:    hCG Quant 15 (*)     All other components within normal limits   CBC WITH AUTO DIFFERENTIAL - Abnormal; Notable for the following components:    WBC 21.1 (*)     Hemoglobin 11.1 (*)     Hematocrit 35.3 (*)     RDW 16.3 (*)     All other components within normal limits   CULTURE, URINE   CULTURE, BLOOD 1   CULTURE, BLOOD 2   VAGINITIS DNA PROBE   C.TRACHOMATIS N.GONORRHOEAE DNA   LACTATE, SEPSIS   PROTIME-INR   SPECIMEN REJECTION   LACTATE, SEPSIS   URINALYSIS WITH MICROSCOPIC   TYPE AND SCREEN         RADIOLOGY:  CT ABDOMEN PELVIS W IV CONTRAST Additional Contrast? None    Result Date: 2/3/2022  EXAMINATION: CT OF THE ABDOMEN AND PELVIS WITH CONTRAST 2/2/2022 11:39 pm TECHNIQUE: CT of the abdomen and pelvis was performed with the administration of intravenous contrast. Multiplanar reformatted images are provided for review. Dose modulation, iterative reconstruction, and/or weight based adjustment of the mA/kV was utilized to reduce the radiation dose to as low as reasonably achievable. COMPARISON: None. HISTORY: ORDERING SYSTEM PROVIDED HISTORY: Fever,back pain, vaginal discharge 1 week post-partum TECHNOLOGIST PROVIDED HISTORY: Fever,back pain, vaginal discharge 1 week post-partum Reason for Exam: Pain, fever,  and discharge post child birth x 1 week FINDINGS: Lower Chest: The lung bases are clear and the heart size is normal. Organs:  The liver, spleen, pancreas, adrenal glands and kidneys are normal. There are no calcified gallstones. No pericholecystic fluid or fat stranding. GI/Bowel: There is a normal retrocecal appendix. Unopacified bowel loops are unremarkable. No bowel obstruction. Pelvis: There is a mildly enlarged postpartum uterus. There is low-attenuation of the endometrium. Ovaries are unremarkable. Urinary bladder was not well distended but appeared grossly normal. Peritoneum/Retroperitoneum: No adenopathy, free air or free fluid. Bones/Soft Tissues: No acute bone or soft tissue abnormality. Unremarkable CT examination of the abdomen and pelvis 1 week post childbirth. There is a mildly enlarged postpartum uterus. Low-attenuation of the endometrium is not unusual 1 week postpartum. There is no significant fluid collection. XR CHEST PORTABLE    Result Date: 2/3/2022  EXAMINATION: ONE XRAY VIEW OF THE CHEST 2/2/2022 11:08 pm COMPARISON: 02/21/2021 HISTORY: ORDERING SYSTEM PROVIDED HISTORY: Fever, back pain TECHNOLOGIST PROVIDED HISTORY: Fever, back pain Reason for Exam: PT CO fever with back pain and headache X several days. FINDINGS: Heart size and configuration are normal.  The lungs are clear. No pneumothorax or pleural fluid. No acute bone finding. No acute cardiopulmonary disease. EKG:    EKG Interpretation    Interpreted by me    Rhythm: Sinus tachycardia  Rate: 130  Axis: normal  Ectopy: none  Conduction: normal  ST Segments: no acute change  T Waves: no acute change  Q Waves: none    Clinical Impression: Sinus tachycardia, otherwise normal EKG    EMERGENCY DEPARTMENT COURSE:  ED Course as of 02/03/22 0037   Thu Feb 03, 2022   0034 CT is unremarkable, but white count is 21 and patient had gross purulence coming from the cervical os. Patient meets criteria for sepsis secondary to likely postpartum endometritis. Given patient's positive group B strep on delivery, will initiate broad-spectrum antibiotic coverage with clindamycin, gentamicin and ampicillin.   Plan for transfer to [facility], where patient's obstetrician is located. [GG]      ED Course User Index  [GG] Keya Witt MD          PROCEDURES:  None    CONSULTS:  IP CONSULT TO OB GYN    CRITICAL CARE:  Please see attending note    FINAL IMPRESSION      1. Fever and chills    2. Postpartum endometritis    3. Sepsis, due to unspecified organism, unspecified whether acute organ dysfunction present ([facility] [address]          DISPOSITION / PLAN     DISPOSITION      Transfer to [facility]:  No follow-up provider specified.     DISCHARGE MEDICATIONS:  New Prescriptions    No medications on file       Keya Witt MD  Emergency Medicine Resident    (Please note that portions of this note were completed with a voice recognition program.Efforts were made to edit the dictations but occasionally words are mis-transcribed.)        Keay Witt MD  Resident  02/03/22 Octavia Quiles MD  Resident  02/03/22 0040

## 2022-02-03 NOTE — ED NOTES
Pelvic exam performed by Dr. Sheryle Dust. Chaperoned by Teetee Bishop RN.      Brenda Shepard RN  02/03/22 [id number]

## 2022-02-03 NOTE — ED NOTES
Pt is 7 days post partum c/o a fever and \"not feeling well\" Pt denies any complications from the vaginal delivery.       Saul Wolf RN  02/02/22 1481

## 2022-02-03 NOTE — ED PROVIDER NOTES
[facility] [address] [facility] ED     Emergency Department     Faculty Attestation        I performed a history and physical examination of the patient and discussed management with the resident. I reviewed the residents note and agree with the documented findings and plan of care. Any areas of disagreement are noted on the chart. I was personally present for the key portions of any procedures. I have documented in the chart those procedures where I was not present during the key portions. I have reviewed the emergency nurses triage note. I agree with the chief complaint, past medical history, past surgical history, allergies, medications, social and family history as documented unless otherwise noted below. Documentation of the HPI, Physical Exam and Medical Decision Making performed by medical students or scribes is based on my personal performance of the HPI, PE and MDM. For Physician Assistant/ Nurse Practitioner cases/documentation I have have had a face to face evaluation with this patient and have completed at least one if not all key elements of the E/M (history, physical exam, and MDM). Additional findings are as noted. Pertinent Comments     Primary Care Physician: No primary care provider on file. History: This is a 27 y.o. female who presents to the Emergency Department with complaint of low back pain and fever. 1 week ago had an uncomplicated vaginal delivery. No significant bleeding or discharge. Physical:     oral temperature is 100.8 °F (38.2 °C). Her blood pressure is 120/80 and her pulse is 132. Her respiration is 20 and oxygen saturation is 98%.    27 y.o. female     Febrile, tachycardic, blood pressure normal overall nontoxic in appearance.     Impression: Sepsis, septic shock, retained products, abdominal infection, UTI, STI    Plan: Labs, fluids, antibiotics, CT abdomen pelvis        1:43 AM EST  Patient likely septic secondary to postpartum endometritis. I spoke with Dr. Charlott Gosselin OB/[facility] who accepted patient under Dr. Gabriella Zuniga OB/GYN at [facility].  They would prefer patient going to [facility] versus [facility]. Updated patient about this plan          \CRITICAL CARE: There was a high probability of clinically significant/life threatening deterioration in this patient's condition which required my urgent intervention. Total critical care time was 35 minutes. This excludes any time for separately reportable procedures. The care is provided during an unprecedented national emergency due to the novel coronavirus, COVID-19.     (Please note that portions of this note were completed with a voice recognition program.  Efforts were made to edit the dictations but occasionally words are mis-transcribed.)    Yoan Thao DO  Attending Emergency Physician         Yoan Thao,   02/02/22 [facility],   02/03/22 7007

## 2022-02-04 LAB
C TRACH DNA GENITAL QL NAA+PROBE: NEGATIVE
CULTURE: ABNORMAL
EKG ATRIAL RATE: 130 BPM
EKG P AXIS: 41 DEGREES
EKG P-R INTERVAL: 128 MS
EKG Q-T INTERVAL: 292 MS
EKG QRS DURATION: 72 MS
EKG QTC CALCULATION (BAZETT): 429 MS
EKG R AXIS: 69 DEGREES
EKG T AXIS: 49 DEGREES
EKG VENTRICULAR RATE: 130 BPM
Lab: ABNORMAL
N. GONORRHOEAE DNA: NEGATIVE
SPECIMEN DESCRIPTION: ABNORMAL
SPECIMEN DESCRIPTION: NORMAL

## 2022-02-08 LAB
CULTURE: NORMAL
Lab: NORMAL
SPECIMEN DESCRIPTION: NORMAL

## 2025-03-28 ENCOUNTER — HOSPITAL ENCOUNTER (OUTPATIENT)
Age: 30
Setting detail: SPECIMEN
Discharge: HOME | End: 2025-03-28

## 2025-03-31 LAB
AMPHET UR QL SCN: POSITIVE
BARBITURATES UR QL SCN: NEGATIVE
BENZODIAZ UR QL: NEGATIVE
CANNABINOIDS UR QL SCN: NEGATIVE
COCAINE UR QL SCN: NEGATIVE
FENTANYL UR QL: NEGATIVE
METHADONE UR QL: NEGATIVE
OPIATES UR QL SCN: NEGATIVE
OXYCODONE UR QL SCN: NEGATIVE
PCP UR QL SCN: NEGATIVE
TEST INFORMATION: ABNORMAL